# Patient Record
Sex: MALE | Race: WHITE
[De-identification: names, ages, dates, MRNs, and addresses within clinical notes are randomized per-mention and may not be internally consistent; named-entity substitution may affect disease eponyms.]

---

## 2018-04-11 ENCOUNTER — HOSPITAL ENCOUNTER (EMERGENCY)
Dept: HOSPITAL 45 - C.EDB | Age: 23
Discharge: HOME | End: 2018-04-11
Payer: OTHER GOVERNMENT

## 2018-04-11 VITALS
WEIGHT: 147.71 LBS | HEIGHT: 72.01 IN | BODY MASS INDEX: 20.01 KG/M2 | WEIGHT: 147.71 LBS | BODY MASS INDEX: 20.01 KG/M2 | HEIGHT: 72.01 IN

## 2018-04-11 VITALS — OXYGEN SATURATION: 97 % | SYSTOLIC BLOOD PRESSURE: 128 MMHG | DIASTOLIC BLOOD PRESSURE: 68 MMHG | HEART RATE: 70 BPM

## 2018-04-11 VITALS — TEMPERATURE: 98.24 F

## 2018-04-11 DIAGNOSIS — K08.89: Primary | ICD-10-CM

## 2018-04-11 NOTE — EMERGENCY ROOM VISIT NOTE
History


Report prepared by Toño:  Roberto Pompa


Under the Supervision of:  Dr. Neri Low M.D.


First contact with patient:  15:06


Chief Complaint:  ILLNESS


Stated Complaint:  SWOLLEN LYMPH NODES, JAW PAIN





History of Present Illness


The patient is a 22 year old male who presents to the Emergency Room with 

complaints of pain in the right side of the bottom of his jaw that started two 

hours ago. The patient states that the pain started suddenly and is now 

radiating up into the right ear. He was not eating when it started. He denies 

any fevers or cough.  Patient states he spoke with his dentist who told him to 

come to the emergency department to begin treatment on antibiotics for possible 

tooth infection.





   Source of History:  patient


   Onset:  2 hours ago


   Position:  jaw (Bottom right)


   Timing:  other (sudden onset)


   Associated Symptoms:  No fevers, No cough


Note:


Radiating up into right ear.





Review of Systems


See HPI for pertinent positives and negatives.  A total of ten systems were 

reviewed and were otherwise negative.





Past Medical & Surgical


patient denies any history





Family History





Patient reports no known family medical history.





Social History


Smoking Status:  Never Smoker


Marital Status:  single


Housing Status:  lives with roommate


Occupation Status:  [employer] student





Current/Historical Medications


Scheduled


Penicillin V Potassium (Veetids), 500 MG PO QID





Allergies


Coded Allergies:  


     No Known Allergies (Unverified , 4/11/18)





Physical Exam


Vital Signs











  Date Time  Temp Pulse Resp B/P (MAP) Pulse Ox O2 Delivery O2 Flow Rate FiO2


 


4/11/18 15:34  70 16 128/68 97   


 


4/11/18 15:03 36.8 86 16 130/71 96 Room Air  











Physical Exam


Physical Exam 


GENERAL:  He is oriented to person, place, and time. He appears well-developed 

and well-nourished. He does not appear distressed. ____


HENT:  Exam performed. 


   Head:  Normocephalic and atraumatic. 


   Right Ear:  External ear normal. No mastoid tenderness. 


   Left Ear: External ear normal. No mastoid tenderness. 


   Mouth/Throat:  There is pain on percussion of the posterior right molar/

wisdom tooth, no obvious dental abscess. The oropharynx is clear and moist. No 

trismus in the jaw. No dental abscesses or uvula swelling. No oropharyngeal 

exudate or tonsillar abscesses.  No tongue elevation or submental swelling.____


EYES: Conjunctivae and EOM are normal. Pupils are equal, round, and reactive to 

light. Right eye exhibits no discharge. Left eye exhibits no discharge. No 

scleral icterus. ____


NECK: Normal range of motion. Neck supple. No JVD present. No spinous process 

tenderness present. No carotid bruit present. No rigidity. No tracheal 

deviation and normal range of motion present. No Brudzinski's sign and no Kernig

's sign noted. ____


CV: Normal rate, regular rhythm, normal heart sounds and intact distal pulses. 

There is no peripheral edema. Palpable radial pulses bue.  ____


PULM/CHEST:  Effort normal and breath sounds normal. No respiratory distress. 

No stridor. He has no wheezes. He has no rales. 


   Chest Wall:  He exhibits no tenderness. ____


ABD: The abdomen is soft. Bowel sounds are normal. He has no distension. No 

mass is present. There is no tenderness. There is no rebound, no guarding, no 

Ordoñez's sign and no tenderness at McBurney's point. Rovsig negative ________


MUSC/SKEL: Normal range of motion. There is no peripheral edema, tenderness or 

deformity. ________


LYMPH: No cervical adenopathy. ____


NEURO: He is alert and oriented to person, place, and time. HE has normal 

strength. No cranial nerve deficit or sensory deficit. Coordination and gait 

normal. GCS eye subscore is 4. GCS verbal subscore is 5. GCS motor subscore is 

6. Cerebellar tests wnl. ____


SKIN: Skin is warm and dry. He is not diaphoretic. ____


PSYCH: He has a normal mood and affect. His behavior is normal. Judgment and 

thought content normal. ____





Medical Decision & Procedures


ED Course


1511: The patient was evaluated in room A2. A complete history and physical 

exam was performed.  Vital signs stable.  No obvious dental abscess, no trismus

, no tongue elevation, no signs for Prabhakar's angina..  Discharge with 

antibiotics and follow-up with dentistry. Plan of care discussed with patient 

and questions answered. The patient was given both verbal and printed discharge 

instructions. The patient verbalized understanding and ability to comply. The 

patient is to seek outpatient follow up as noted in the discharge instructions. 

The patient verbalized understanding and ability to comply. The patient is 

discharged in stable condition. The patient was instructed to return for 

worsening symptoms.





Medical Decision


Vital signs stable.  No obvious dental abscess, no trismus, no tongue elevation

, no signs for Prabhakar's angina..  Discharge with antibiotics and follow-up with 

dentistry. Plan of care discussed with patient and questions answered. The 

patient was given both verbal and printed discharge instructions. The patient 

verbalized understanding and ability to comply. The patient is to seek 

outpatient follow up as noted in the discharge instructions. The patient 

verbalized understanding and ability to comply. The patient is discharged in 

stable condition. The patient was instructed to return for worsening symptoms.





Medication Reconcilliation


Current Medication List:  was personally reviewed by me





Blood Pressure Screening


Patient's blood pressure:  Normal blood pressure





Impression





 Primary Impression:  


 Pain, dental





Scribe Attestation


The scribe's documentation has been prepared under my direction and personally 

reviewed by me in its entirety. I confirm that the note above accurately 

reflects all work, treatment, procedures, and medical decision making performed 

by me.





The chart was completed utilizing Dragon Speech voice recognition software. 

Grammatical errors, random word insertions, pronoun errors, and incomplete 

sentences are an occasional consequence of this system due to software 

limitations, ambient noise, and hardware issues. Any formal questions or 

concerns about the content, text, or information contained within the body of 

this dictation should be directly addressed to the physician for clarification.





Departure Information


Dispostion


Home / Self-Care





Prescriptions





Penicillin V Potassium (Veetids) 500 Mg Tab


500 MG PO QID for 10 Days, #40 TAB


   Prov: Neri Low M.D.         4/11/18





Referrals


No Doctor, Assigned (PCP)





Patient Instructions


My [facility]